# Patient Record
Sex: MALE | Race: WHITE | URBAN - METROPOLITAN AREA
[De-identification: names, ages, dates, MRNs, and addresses within clinical notes are randomized per-mention and may not be internally consistent; named-entity substitution may affect disease eponyms.]

---

## 2018-02-20 ENCOUNTER — OFFICE VISIT (OUTPATIENT)
Dept: PSYCHIATRY | Facility: CLINIC | Age: 18
End: 2018-02-20
Attending: PSYCHIATRY & NEUROLOGY
Payer: COMMERCIAL

## 2018-02-20 ENCOUNTER — TELEPHONE (OUTPATIENT)
Dept: PSYCHIATRY | Facility: CLINIC | Age: 18
End: 2018-02-20

## 2018-02-20 DIAGNOSIS — F42.2 MIXED OBSESSIONAL THOUGHTS AND ACTS: Primary | ICD-10-CM

## 2018-02-20 NOTE — PROGRESS NOTES
"PSYCHIATRY INITIAL EVALUATION     Referring Clinician/Clinic: KENDELL Gresham at [facility]     Patient Age: 17 Patient Gender: M Date of Assessment: 02/20/2018     Chief Complaint:  Longstanding history of OCD. Symptoms have reportedly worsened in the past 2 months.     History of Present Illness: Huey presented alone for a 60 minute intake appointment (12:30-1:30pm).     He reports a history of OCD (checking rituals) since middle school. Throughout middle school and high school, he reportedly had a ritual at bedtime where he checked the outside house doors, closed the blinds, checked around his bed and in his closet to make sure things were in the right place, and closed his door at the same angle each night.    Currently, patient reports three areas of checking. Before bed (and when he leaves his dorm), he checks his desk from left to right to make sure that his possessions are in the correct spot and accounted for. He checks the items in the same order each time. In the past (last semester) this nighttime ritual took ~5 minutes, and now he reports it takes ~15-20 minutes each night. He reports taking pictures of his dorm room as an additional check. He also reports checking that his dorm room door is locked; he said he will repeatedly check the lock, walk back after leaving to check again, and take pictures/videos of him checking the lock. This takes around 3 minutes to finish the ritual. He also reports checking his backpack to make sure that his laptop is in the back pocket multiple times when he leaves a class, and checking around his desk when he leaves.     He reported that the worry about losing or misplacing something takes 30-60 minutes total of his day and rated it a 5-6/10 on an intensity scale. If he thinks he loses something, he reportedly \"shuts down\" and will disengage from whatever task he is doing and can't move on until he finds the item. This happens 1-2 times per day.    He also reported " "generalized anxiety/worry about numerous things and said he tends to \"overthink.\" He rated this a 6/10 in intensity and said he spends more than half of the waking hours worrying.    Current Medications per patient report: Cefuroxime.     Current/Past Medical History: non-contributory.     Past Psychiatric History:   Hospitalization: none reported.  Psychiatric Medications: none reported.  Psychotherapy: counseling at [facility]. No CBT.    Past/Current Chemical Dependency: Denied.    Family History: Per patient report, positive for depression (sibling; mother; maternal aunt/grandmother); anxiety (sibling and mother).     Social History: Born and raised in [place], [place].  Birth through high school: Graduated high school.  Post-secondary education: Currently enrolled as a freshman at the [employer]. Reported that this semester is easier than last semester. Considering communications major.  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    Other Data: N/A   Test Data: N/A  Records Provided: N/A  Axis I: OCD (DSM-5 300.3 (F42.2) )     Goals/Plan: Continue with 10-sessions of CBT/exposure and response prevention treatment for OCD next week. He was asked to monitor rituals/symptoms and complete the intake questionnaire before our second session (2/27/18).       Appearance Appropriate / Stated Age / Well Groomed   ________________________________________________________  Speech Logical ________________  1)   Thought Process  Logical, Goal Oriented ______________________    Thought Content No evidence of delusions/Hallucinations. Consistent with conversation. __________________________________________________  Suicide/Homicide Risk Denied.  ______  Insight/Judgment Good ___   ______  Mood  Euthymic   ______  Affect Appropriate _    Orientation Time, Place, Person Intact ______________________________________  Musculoskeletal Gait, Posture Unremarkable " _______________________________________  Memory Intact    Attention Intact    Language/Knowledge Intact

## 2018-02-20 NOTE — TELEPHONE ENCOUNTER
Patient completed a New Patient Questionnaire.  I sent the document to scanning and held a copy in Psychiatry until scanning complete/confirmed.Mary Kay Muniz/LUCIEN

## 2018-02-20 NOTE — MR AVS SNAPSHOT
After Visit Summary   2018    Huey Burnette    MRN: [id number]           Patient Information     Date Of Birth          2000        Visit Information        Provider Department      2018 12:30 PM [facility]        Today's Diagnoses     Mixed obsessional thoughts and acts    -  1       Follow-ups after your visit        Your next 10 appointments already scheduled     2018 12:30 PM CST   Adult Psychotherapy with [facility]   Psychiatry Clinic ([facility])    [address]  [address] [facility] [address] [phone]              Who to contact     Please call your clinic at [phone] to:    Ask questions about your health    Make or cancel appointments    Discuss your medicines    Learn about your test results    Speak to your doctor            Additional Information About Your Visit        MyChart Information     Rexlyt is an electronic gateway that provides easy, online access to your medical records. With [email or web address], you can request a clinic appointment, read your test results, renew a prescription or communicate with your care team.     To sign up for Rexlyt visit the website at [email or web address]   You will be asked to enter the access code listed below, as well as some personal information. Please follow the directions to create your username and password.     Your access code is: [email or web address]  Expires: 2018  2:01 PM     Your access code will  in 90 days. If you need help or a new code, please contact your [facility] or call [phone] for assistance.        Care EveryWhere ID     This is your Care EveryWhere ID. This could be used by other organizations to access your [facility] medical records  [id number]         Blood Pressure from Last 3 Encounters:   03/13/15 100/59   12 101/64    Weight from Last 3 Encounters:   03/13/15 57.5 kg  (126 lb 12.2 oz) (29 %)*   06/22/12 43 kg (94 lb 12.8 oz) (21 %)*     * Growth percentiles are based on [facility] 2-20 Years data.              Today, you had the following     No orders found for display       Primary Care Provider Office Phone # Fax #    Ulises Taveras [phone]       [facility] [address] [facility] [address]  [facility] [address]        Equal Access to Services     ERICKSON SCHILLING : Hadii aad ku hadasho Soomaali, waaxda luqadaha, qaybta kaalmada adeegyada, waxay idiin hayaan adeeg kharash la'aan . So [facility] [phone].    ATENCIÓN: Si habla español, tiene a gresham disposición servicios gratuitos de asistencia lingüística. [facility] [phone].    We comply with applicable federal civil rights laws and [place] laws. We do not discriminate on the basis of race, color, national origin, age, disability, sex, sexual orientation, or gender identity.            Thank you!     Thank you for choosing PSYCHIATRY CLINIC  for your care. Our goal is always to provide you with excellent care. Hearing back from our patients is one way we can continue to improve our services. Please take a few minutes to complete the written survey that you may receive in the mail after your visit with us. Thank you!             Your Updated Medication List - Protect others around you: Learn how to safely use, store and throw away your medicines at www.disposemymeds.org.      Notice  As of 2/20/2018 11:59 PM    You have not been prescribed any medications.

## 2018-02-21 ASSESSMENT — PATIENT HEALTH QUESTIONNAIRE - PHQ9: SUM OF ALL RESPONSES TO PHQ QUESTIONS 1-9: 10

## 2018-02-22 NOTE — PROGRESS NOTES
I (Constantino Galicia, Ph.D., ) reviewed this note and agree with its contents.  I was present during the key elements of this evaluation.  This session will be discussed in supervision prior to the patient's next scheduled clinic appointment.

## 2018-02-27 ENCOUNTER — OFFICE VISIT (OUTPATIENT)
Dept: PSYCHIATRY | Facility: CLINIC | Age: 18
End: 2018-02-27
Attending: PSYCHIATRY & NEUROLOGY
Payer: COMMERCIAL

## 2018-02-27 DIAGNOSIS — F42.2 MIXED OBSESSIONAL THOUGHTS AND ACTS: Primary | ICD-10-CM

## 2018-02-27 NOTE — MR AVS SNAPSHOT
After Visit Summary   2018    Huey Burnette    MRN: [id number]           Patient Information     Date Of Birth          2000        Visit Information        Provider Department      2018 12:30 PM [facility]        Today's Diagnoses     Mixed obsessional thoughts and acts    -  1       Follow-ups after your visit        Your next 10 appointments already scheduled     Mar 13, 2018 12:30 PM CDT   Adult Psychotherapy with [facility]   Psychiatry Clinic ([facility])    [address]  [address] [address] [phone]              Who to contact     Please call your clinic at [phone] to:    Ask questions about your health    Make or cancel appointments    Discuss your medicines    Learn about your test results    Speak to your doctor            Additional Information About Your Visit        MyChart Information     Petcube is an electronic gateway that provides easy, online access to your medical records. With Petcube, you can request a clinic appointment, read your test results, renew a prescription or communicate with your care team.     To sign up for Advanced Personalized Diagnosticst visit the website at [email or web address]   You will be asked to enter the access code listed below, as well as some personal information. Please follow the directions to create your username and password.     Your access code is: [email or web address]  Expires: 2018  2:01 PM     Your access code will  in 90 days. If you need help or a new code, please contact your [facility] or call [phone] for assistance.        Care EveryWhere ID     This is your Care EveryWhere ID. This could be used by other organizations to access your [place] medical records  [id number]         Blood Pressure from Last 3 Encounters:   03/13/15 100/59   12 101/64    Weight from Last 3 Encounters:   03/13/15 57.5 kg  (126 lb 12.2 oz) (29 %)*   06/22/12 43 kg (94 lb 12.8 oz) (21 %)*     * Growth percentiles are based on [facility] 2-20 Years data.              Today, you had the following     No orders found for display       Primary Care Provider Office Phone # Fax #    Ulises Taveras [phone]       [facility] [address] [address]  [facility] [address]        Equal Access to Services     ERICKSON SCHILLING : Hadii aad ku hadasho Soomaali, waaxda luqadaha, qaybta kaalmada adeegyada, waxay idiin hayaan adeeg kharash la'aan . So [facility] [phone].    ATENCIÓN: Si habla español, tiene a gresham disposición servicios gratuitos de asistencia lingüística. [facility] [phone].    We comply with applicable federal civil rights laws and [place] laws. We do not discriminate on the basis of race, color, national origin, age, disability, sex, sexual orientation, or gender identity.            Thank you!     Thank you for choosing PSYCHIATRY CLINIC  for your care. Our goal is always to provide you with excellent care. Hearing back from our patients is one way we can continue to improve our services. Please take a few minutes to complete the written survey that you may receive in the mail after your visit with us. Thank you!             Your Updated Medication List - Protect others around you: Learn how to safely use, store and throw away your medicines at www.disposemymeds.org.      Notice  As of 2/27/2018 11:59 PM    You have not been prescribed any medications.

## 2018-02-27 NOTE — PROGRESS NOTES
"I met with Huey for 60 minutes (12:30-1:30 pm) of CBT for OCD. This was our second session. He completed all questionnaires and daily monitoring forms.     The majority of our session was spent discussing how the monitoring went and getting details about the rituals. He said that he noticed there isn't a certain number or amount of time he has to check; instead, he will check until it he has a \"gut feeling\" that he's done.    For his nighttime desk checking, he first does a general visual scan of both desks (left to right), and then will move left to right tapping/touching each item, then does another general sweep, makes sure everything is exactly in the right spot, and checks that his cards are in his wallet.    For the lock checking, he will lock the door, check it twice while mentally reciting the phrase \"Let's lock, let's lock today,\" and then walk down the hallway and walk back to check it three or so times. At night he mentally pairs a word (e.g., \"Shauna Streep\") with checking the lock as an associative reminder to himself if he is later unsure whether he checked the lock.      He deleted all of the videos and checking-related photos from his phone during the session, and said that it felt \"good\" to do so.    We began to make a hierarchy, and he rated most things in the 10-20 range on the SUDs. We will continue this next session.    For homework, he was asked to completely eliminate all video/photo recording, and to randomly rearrange the items on his desk approximately one hour before bed. He said that this would likely be uncomfortable but doable. We discussed the rationale for exposures and he was receptive to this.     We will meet in one week for session three.        Appearance Appropriate / Stated Age / Well Groomed   ________________________________________________________  Speech Logical ________________    Thought Process  Logical, Goal Oriented ______________________     Thought Content No " evidence of delusions/Hallucinations. Consistent with conversation. ________________  __________________________________  Suicide/Homicide Risk none reported.  ______  Insight/Judgment Good ___   ______  Mood  Euthymic   ______  Affect Appropriate _     Orientation Time, Place, Person Intact   ___________________  Musculoskeletal Gait, Posture Unremarkable _______________________________________  Memory Intact     Attention Intact     Language/Knowledge Intact

## 2018-03-06 ENCOUNTER — OFFICE VISIT (OUTPATIENT)
Dept: PSYCHIATRY | Facility: CLINIC | Age: 18
End: 2018-03-06
Attending: PSYCHOLOGIST
Payer: COMMERCIAL

## 2018-03-06 DIAGNOSIS — F42.2 MIXED OBSESSIONAL THOUGHTS AND ACTS: Primary | ICD-10-CM

## 2018-03-06 NOTE — MR AVS SNAPSHOT
After Visit Summary   3/6/2018    Huey Burnette    MRN: [id number]           Patient Information     Date Of Birth          2000        Visit Information        Provider Department      3/6/2018 12:30 PM [facility]        Today's Diagnoses     Mixed obsessional thoughts and acts    -  1       Follow-ups after your visit        Your next 10 appointments already scheduled     Mar 13, 2018 12:30 PM CDT   Adult Psychotherapy with [facility]   Psychiatry Clinic ([facility])    [address]  [address] [address] [phone]              Who to contact     Please call your clinic at [phone] to:    Ask questions about your health    Make or cancel appointments    Discuss your medicines    Learn about your test results    Speak to your doctor            Additional Information About Your Visit        MyChart Information     Meal Sharing is an electronic gateway that provides easy, online access to your medical records. With Meal Sharing, you can request a clinic appointment, read your test results, renew a prescription or communicate with your care team.     To sign up for Foodziet visit the website at [email or web address]   You will be asked to enter the access code listed below, as well as some personal information. Please follow the directions to create your username and password.     Your access code is: [email or web address]  Expires: 2018  2:01 PM     Your access code will  in 90 days. If you need help or a new code, please contact your [facility] or call [phone] for assistance.        Care EveryWhere ID     This is your Care EveryWhere ID. This could be used by other organizations to access your [facility] medical records  [id number]         Blood Pressure from Last 3 Encounters:   03/13/15 100/59   12 101/64    Weight from Last 3 Encounters:   03/13/15 57.5 kg  (126 lb 12.2 oz) (29 %)*   06/22/12 43 kg (94 lb 12.8 oz) (21 %)*     * Growth percentiles are based on [facility] 2-20 Years data.              Today, you had the following     No orders found for display       Primary Care Provider Office Phone # Fax #    Ulises Taveras [phone]       [facility] [address] [address]  [facility] [address]        Equal Access to Services     ERICKSON SCHILLING : Hadii aad ku hadasho Soomaali, waaxda luqadaha, qaybta kaalmada adeegyada, waxay idiin hayaan adeeg kharash la'aan . So [facility] [phone].    ATENCIÓN: Si habla español, tiene a gresham disposición servicios gratuitos de asistencia lingüística. [facility] [phone].    We comply with applicable federal civil rights laws and [place] laws. We do not discriminate on the basis of race, color, national origin, age, disability, sex, sexual orientation, or gender identity.            Thank you!     Thank you for choosing PSYCHIATRY CLINIC  for your care. Our goal is always to provide you with excellent care. Hearing back from our patients is one way we can continue to improve our services. Please take a few minutes to complete the written survey that you may receive in the mail after your visit with us. Thank you!             Your Updated Medication List - Protect others around you: Learn how to safely use, store and throw away your medicines at www.disposemymeds.org.      Notice  As of 3/6/2018 11:59 PM    You have not been prescribed any medications.

## 2018-03-06 NOTE — PROGRESS NOTES
"I met with Huey for 60 minutes (12:30-1:30) of CBT/ERP for OCD. This was our third session. He completed all daily monitoring and homework assignments. The majority of the session was spent discussing the homework, completing the hierarchy, and designing future homework.    He completed both daily homework assignments. He completely eliminated all video/photo recording. He reported feeling the need to take videos/photos while locking the door or checking the desk, but did not give in to the urge. His SUDs reportedly peaked around 20-25 the first few days he refrained, but his SUDs reduced to 10-15 by the end of the week. He successfully rearranged the items on his desk each night, both in terms of where they go and left/right side of the desk. He said it felt \"funky\" to not have things in the 'right place.' His SUDs peaked around 20 for the first few exposures, and decreased with repetition. He said that he is still checking the desk in the same fashion (e.g., left to right, tapping), and that he is associating the objects with their position each time he shuffles.    The amount of reported time checking the desk has decreased to 10-15 minutes a night, but the lock checking has increased to 5-6 minutes (he attributes this to extra checking because of the removal of the video).    Desk checking hierarchy:  - Only physically touching wallet, notebooks, and laptop - 20  - No physical touching - 20-30  - Checking for max 10 minutes - 20-30  - Checking for max 5 minutes - 40-50 (he said he would likely \"crash\" if he did this now)  - No scanning or checking - 70-80    Lock checking hierarchy:  - Check lock a maximum of 3 times - 20-30  - No verbal reminders - \"difficult\"  - No touching door handle after locking - third most difficult  - Locking and checking once while occupied with phone - second most difficult  - Just lock and leave, no checking - first most difficult     He was visibly anxious while discussing some of " the possible homeworks. I introduced the imaginal exposures, and he was interested in trying this exercise. We will plan to record one next session.    He also endorsed some contamination concerns on the questionnaires. When asked, he stated avoiding public restrooms for fear of contamination and said he could not touch something in one and not wash his hands. He was slightly resistive when discussing this area, and so I did not probe further.    For homework, he was asked to continue shuffling items and refraining from video/photo recording. In addition, we decided on 1) limiting the time he can check the desk at night to 10 minutes, and 2) establishing a max of 3 door lock checks. He was receptive to this.     We will meet in one week for session 4.      Appearance Appropriate / Stated Age / Well Groomed   ________________________________________________________  Speech Logical ________________     Thought Process  Logical, Goal Oriented ______________________      Thought Content No evidence of delusions/Hallucinations. Consistent with conversation. ________________  __________________________________  Suicide/Homicide Risk none reported.  ______  Insight/Judgment Good ___   ______  Mood  Euthymic   ______  Affect Appropriate; anxious when discussing potential homeworks      Orientation Time, Place, Person Intact   ___________________  Musculoskeletal Gait, Posture Unremarkable _______________________________________  Memory Intact      Attention Intact      Language/Knowledge Intact

## 2018-03-08 NOTE — PROGRESS NOTES
I (Constantino Galicia, Ph.D., ) reviewed this note and agree with its contents.  I did not staff this session in clinic. This session will be discussed in supervision prior to this patient's next scheduled clinic appointment.

## 2018-03-13 ENCOUNTER — OFFICE VISIT (OUTPATIENT)
Dept: PSYCHIATRY | Facility: CLINIC | Age: 18
End: 2018-03-13
Attending: PSYCHIATRY & NEUROLOGY
Payer: COMMERCIAL

## 2018-03-13 DIAGNOSIS — F42.2 MIXED OBSESSIONAL THOUGHTS AND ACTS: Primary | ICD-10-CM

## 2018-03-13 NOTE — MR AVS SNAPSHOT
After Visit Summary   3/13/2018    Huey Burnette    MRN: [id number]           Patient Information     Date Of Birth          2000        Visit Information        Provider Department      3/13/2018 12:30 PM [facility]        Today's Diagnoses     Mixed obsessional thoughts and acts    -  1       Follow-ups after your visit        Your next 10 appointments already scheduled     Mar 27, 2018 12:30 PM CDT   Adult Psychotherapy with [facility]   Psychiatry Clinic ([facility])    [address]  [address] [address] [phone]              Who to contact     Please call your clinic at [phone] to:    Ask questions about your health    Make or cancel appointments    Discuss your medicines    Learn about your test results    Speak to your doctor            Additional Information About Your Visit        MyChart Information     Skyword is an electronic gateway that provides easy, online access to your medical records. With Skyword, you can request a clinic appointment, read your test results, renew a prescription or communicate with your care team.     To sign up for J2 Software Solutionst visit the website at [email or web address]   You will be asked to enter the access code listed below, as well as some personal information. Please follow the directions to create your username and password.     Your access code is: [email or web address]  Expires: 2018  3:01 PM     Your access code will  in 90 days. If you need help or a new code, please contact your [facility] or call [phone] for assistance.        Care EveryWhere ID     This is your Care EveryWhere ID. This could be used by other organizations to access your [place] medical records  [id number]         Blood Pressure from Last 3 Encounters:   03/13/15 100/59   12 101/64    Weight from Last 3 Encounters:   03/13/15 57.5 kg  (126 lb 12.2 oz) (29 %)*   06/22/12 43 kg (94 lb 12.8 oz) (21 %)*     * Growth percentiles are based on [facility][note 2] 2-20 Years data.              Today, you had the following     No orders found for display       Primary Care Provider Office Phone # Fax #    Ulises Taveras [phone]       [facility] [address] [address]  [facility] [address]        Equal Access to Services     ERICKSON SCHILLING : Hadii aad ku hadasho Soomaali, waaxda luqadaha, qaybta kaalmada adeegyada, waxay idiin hayaan adeeg kharash la'aan . So [facility] [phone].    ATENCIÓN: Si habla español, tiene a gresham disposición servicios gratuitos de asistencia lingüística. [facility] [phone].    We comply with applicable federal civil rights laws and [place] laws. We do not discriminate on the basis of race, color, national origin, age, disability, sex, sexual orientation, or gender identity.            Thank you!     Thank you for choosing [facility]  for your care. Our goal is always to provide you with excellent care. Hearing back from our patients is one way we can continue to improve our services. Please take a few minutes to complete the written survey that you may receive in the mail after your visit with us. Thank you!             Your Updated Medication List - Protect others around you: Learn how to safely use, store and throw away your medicines at www.disposemymeds.org.      Notice  As of 3/13/2018 11:59 PM    You have not been prescribed any medications.

## 2018-03-13 NOTE — PROGRESS NOTES
I met with Huey 47 minutes (12:30-1:17 pm) of CBT/ERP for OCD. This was our fourth session. He completed all monitoring and homework assignments. He reported noticing some symptom improvement over the last week. The majority of this session was spent discussing the homework and doing an imaginal exposure.    He successfully completed both homework assignments (limiting desk checking to 10 minutes; limiting door checking to three times), although he was only about to do three days of them because he is staying at his parent's for spring break. Per his report and the SUDs ratings, he experienced habituation for the desk checking. He said the door checking was more difficult, but still doable, and the amount of time he is spending on checking has decreased.    We worked together to write an imaginal exposure involving him being unsure of locking his dorm door, going to class, and coming back to find that someone has broken in and gone through/stolen his possessions. I explained the rational for the imaginal exposures, and he was receptive. I had him write out the scenario and then record it; his SUDs peaked at 20-25, and the exercise was reportedly anxiety-provoking for him. We discussed the process of habituation for this exercise, and he was receptive.    For homework, we agreed on him listening to the imaginal exposure twice per day, and eliminating all lock checking that he is doing at his parent's house. Next session we will return to working on the desk checking and dorm locking. I will see him in one week for session 5.     Appearance Appropriate / Stated Age / Well Groomed   ________________________________________________________  Speech Logical ________________      Thought Process  Logical, Goal Oriented ______________________      Thought Content No evidence of delusions/Hallucinations. Consistent with conversation. ________________  __________________________________  Suicide/Homicide Risk none  reported.  ______  Insight/Judgment Good ___   ______  Mood  Euthymic   ______  Affect Appropriate; anxious when discussing potential homeworks      Orientation Time, Place, Person Intact   ___________________  Musculoskeletal Gait, Posture Unremarkable _______________________________________  Memory Intact      Attention Intact      Language/Knowledge Intact

## 2018-03-20 ENCOUNTER — OFFICE VISIT (OUTPATIENT)
Dept: PSYCHIATRY | Facility: CLINIC | Age: 18
End: 2018-03-20
Attending: PSYCHOLOGIST
Payer: COMMERCIAL

## 2018-03-20 DIAGNOSIS — F42.2 MIXED OBSESSIONAL THOUGHTS AND ACTS: Primary | ICD-10-CM

## 2018-03-20 NOTE — MR AVS SNAPSHOT
After Visit Summary   3/20/2018    Huey Burnette    MRN: [id number]           Patient Information     Date Of Birth          2000        Visit Information        Provider Department      3/20/2018 12:30 PM [facility]        Today's Diagnoses     Mixed obsessional thoughts and acts    -  1       Follow-ups after your visit        Your next 10 appointments already scheduled     Mar 27, 2018 12:30 PM CDT   Adult Psychotherapy with [facility]   Psychiatry Clinic ([facility])    [address]  [address] [address] [phone]              Who to contact     Please call your clinic at [phone] to:    Ask questions about your health    Make or cancel appointments    Discuss your medicines    Learn about your test results    Speak to your doctor            Additional Information About Your Visit        MyChart Information     [employer] is an electronic gateway that provides easy, online access to your medical records. With [employer], you can request a clinic appointment, read your test results, renew a prescription or communicate with your care team.     To sign up for WorkToucht visit the website at [email or web address]   You will be asked to enter the access code listed below, as well as some personal information. Please follow the directions to create your username and password.     Your access code is: [email or web address]  Expires: 2018  3:01 PM     Your access code will  in 90 days. If you need help or a new code, please contact your [facility] or call [phone] for assistance.        Care EveryWhere ID     This is your Care EveryWhere ID. This could be used by other organizations to access your [place] medical records  [id number]         Blood Pressure from Last 3 Encounters:   03/13/15 100/59   12 101/64    Weight from Last 3 Encounters:   03/13/15 57.5 kg  (126 lb 12.2 oz) (29 %)*   06/22/12 43 kg (94 lb 12.8 oz) (21 %)*     * Growth percentiles are based on [facility] 2-20 Years data.              Today, you had the following     No orders found for display       Primary Care Provider Office Phone # Fax #    Ulises Taveras [phone]       [facility] [address] [address]  [facility] [address]        Equal Access to Services     ERICKSON SCHILLING : Hadii aad ku hadasho Soomaali, waaxda luqadaha, qaybta kaalmada adeegyada, waxay idiin hayaan adeeg kharash la'aan . So [facility] [phone].    ATENCIÓN: Si habla español, tiene a gresham disposición servicios gratuitos de asistencia lingüística. [facility] [phone].    We comply with applicable federal civil rights laws and [place] laws. We do not discriminate on the basis of race, color, national origin, age, disability, sex, sexual orientation, or gender identity.            Thank you!     Thank you for choosing [facility]  for your care. Our goal is always to provide you with excellent care. Hearing back from our patients is one way we can continue to improve our services. Please take a few minutes to complete the written survey that you may receive in the mail after your visit with us. Thank you!             Your Updated Medication List - Protect others around you: Learn how to safely use, store and throw away your medicines at www.disposemymeds.org.      Notice  As of 3/20/2018 11:59 PM    You have not been prescribed any medications.

## 2018-03-20 NOTE — PROGRESS NOTES
"I met with Huey for 62 minutes (12:30-1:32pm) of CBT/ERP for OCD. This was our fifth session. He completed all daily monitoring and homework assignments.    The majority of our session was spent discussing the homework, doing an imaginal exposure, and discussing the homework for the next week. We discussed progress so far, and he stated that he is noticing improvements both in the OCD-related problems and anxiety in general.    He reported experiencing some habituation to the imaginal exposure homework, and said that it was not very activating unless he was already feeling anxious. He successfully refrained from checking the locks at his parent's home over spring break.    He completed another imaginal exposure in session, where he imagined himself having the intrusive thoughts while walking toward a particular corner that often elicits the worry regarding door locking. The imaginal scenario involved thoughts that someone broke into his dorm, handled his possessions with dirty hands (invading his privacy and contamination), and stole his valuables. He reported that the exercise was anxiety-provoking and felt like it would be a good exposure.     We collaborated on the homeworks for this week. He was asked to:  1) Listen to the imaginal exposure two times per day at the spot he describes in the imaginal (he suggested that this would be more difficult and \"effective\" than doing it in his dorm). He was asked not to go back to his dorm for one hour after listening to the imaginal.    2) Limit the desk checking to four minutes for three nights, and if he experiences habituation, move down to two minutes. He reportedly felt that going straight to two minutes would be too difficult, so we negotiated to four then two.    3) Eliminate all but the physical check of the door handle, limiting the time to one second (counting \"one-one thousand\" out loud), and a maximum of two checks. He felt that eliminating both the physical " check and going to only one was too much too soon, so we negotiated to eliminate all but physically checking the door handle.    We will meet in one week for session six.      Appearance Appropriate / Stated Age / Well Groomed   ________________________________________________________  Speech Logical ________________      Thought Process  Logical, Goal Oriented ______________________      Thought Content No evidence of delusions/Hallucinations. Consistent with conversation. ________________  __________________________________  Suicide/Homicide Risk none reported.  ______  Insight/Judgment Good ___   ______  Mood  Euthymic   ______  Affect Appropriate; anxious when discussing potential homeworks      Orientation Time, Place, Person Intact   ___________________  Musculoskeletal Gait, Posture Unremarkable _______________________________________  Memory Intact      Attention Intact      Language/Knowledge Intact

## 2018-03-23 NOTE — PROGRESS NOTES
I (Constantino Galicia, Ph.D., ) reviewed this note and agree with its contents. I did not staff this session in clinic. This session will be discussed in supervision prior to the patient's next scheduled clinic appointment.

## 2018-03-27 ENCOUNTER — OFFICE VISIT (OUTPATIENT)
Dept: PSYCHIATRY | Facility: CLINIC | Age: 18
End: 2018-03-27
Attending: PSYCHIATRY & NEUROLOGY
Payer: COMMERCIAL

## 2018-03-27 DIAGNOSIS — F42.2 MIXED OBSESSIONAL THOUGHTS AND ACTS: Primary | ICD-10-CM

## 2018-03-27 NOTE — MR AVS SNAPSHOT
After Visit Summary   3/27/2018    Huey Burnette    MRN: [id number]           Patient Information     Date Of Birth          2000        Visit Information        Provider Department      3/27/2018 12:30 PM [facility]        Today's Diagnoses     Mixed obsessional thoughts and acts    -  1       Follow-ups after your visit        Your next 10 appointments already scheduled     2018 12:30 PM CDT   Adult Psychotherapy with [facility]   Psychiatry Clinic ([facility])    [address]  [address] [address] [phone]              Who to contact     Please call your clinic at [phone] to:    Ask questions about your health    Make or cancel appointments    Discuss your medicines    Learn about your test results    Speak to your doctor            Additional Information About Your Visit        MyChart Information     [employer] is an electronic gateway that provides easy, online access to your medical records. With [employer], you can request a clinic appointment, read your test results, renew a prescription or communicate with your care team.     To sign up for eyesFindert visit the website at [email or web address]   You will be asked to enter the access code listed below, as well as some personal information. Please follow the directions to create your username and password.     Your access code is: [email or web address]  Expires: 2018  3:01 PM     Your access code will  in 90 days. If you need help or a new code, please contact your [facility] or call [phone] for assistance.        Care EveryWhere ID     This is your Care EveryWhere ID. This could be used by other organizations to access your [place] medical records  [id number]         Blood Pressure from Last 3 Encounters:   03/13/15 100/59   12 101/64    Weight from Last 3 Encounters:   03/13/15 57.5 kg  (126 lb 12.2 oz) (29 %)*   06/22/12 43 kg (94 lb 12.8 oz) (21 %)*     * Growth percentiles are based on [facility] 2-20 Years data.              Today, you had the following     No orders found for display       Primary Care Provider Office Phone # Fax #    Ulises Taveras [phone]       [facility] [address] [address]  [facility] [address]        Equal Access to Services     ERICKSON SCHILLING : Hadii aad ku hadasho Soomaali, waaxda luqadaha, qaybta kaalmada adeegyada, waxay idiin hayaan adeeg kharash la'aan . So [facility] [phone].    ATENCIÓN: Si habla español, tiene a gresham disposición servicios gratuitos de asistencia lingüística. [facility] [phone].    We comply with applicable federal civil rights laws and [place] laws. We do not discriminate on the basis of race, color, national origin, age, disability, sex, sexual orientation, or gender identity.            Thank you!     Thank you for choosing [facility]  for your care. Our goal is always to provide you with excellent care. Hearing back from our patients is one way we can continue to improve our services. Please take a few minutes to complete the written survey that you may receive in the mail after your visit with us. Thank you!             Your Updated Medication List - Protect others around you: Learn how to safely use, store and throw away your medicines at www.disposemymeds.org.      Notice  As of 3/27/2018 11:59 PM    You have not been prescribed any medications.

## 2018-03-27 NOTE — PROGRESS NOTES
"I met with Huey for 53 minutes (12:30-1:23pm) of CBT/ERP for OCD. This was our sixth session. He successfully completed the daily monitoring and homework assignments.    We discussed treatment progression so far, and we mutually agreed that he has made good progress and has been committed to the therapy. I commended him on his progress, and motivated him to leverage that momentum to help work through the remaining hierarchy. He was receptive to this.    He stated that the desk checking behaviors have improved, but that the door checking has been more difficult. Per the forms, his SUDs peaked at 40 when he limited desk checking to 4 minutes, but experienced habituation and was able to go down to a 2 minute limit (SUDs = 10). In terms of door lock checking, he did go down to a single check of the handle while counting \"one-one thousand\", but \"cheated\" (his term) a few times and recheck once. His SUDs peaked at 50 during the locking. He did listen to the imaginal exposure 5/7 days, and said that it was not very activating.    We discussed treatment expectations and planned for the remaining sessions. He stated that his goal is to completely eliminate all desk and door checking.     We mutually agreed on the following homework:    1) Eliminate physical checking of desk, limit visual scanning to 30 seconds, stand back 3 feet when checking.    2) He was asked to do just a single check of the door handle while counting to one out loud. For the next week, we agreed to do a single check while distracted, then no checking, then no checking while distracted; this progression was planned to work toward no checking, which is the top of his hierarchy.     We will meet in one week for session seven.       Appearance Appropriate / Stated Age / Well Groomed   ________________________________________________________  Speech Logical ________________      Thought Process  Logical, Goal Oriented ______________________      Thought " Content No evidence of delusions/Hallucinations. Consistent with conversation. ________________  __________________________________  Suicide/Homicide Risk none reported.  ______  Insight/Judgment Good ___   ______  Mood  Euthymic   ______  Affect Appropriate; anxious when discussing potential homeworks      Orientation Time, Place, Person Intact   ___________________  Musculoskeletal Gait, Posture Unremarkable _______________________________________  Memory Intact      Attention Intact      Language/Knowledge Intact

## 2018-04-03 ENCOUNTER — OFFICE VISIT (OUTPATIENT)
Dept: PSYCHIATRY | Facility: CLINIC | Age: 18
End: 2018-04-03
Attending: PSYCHIATRY & NEUROLOGY
Payer: COMMERCIAL

## 2018-04-03 DIAGNOSIS — F42.2 MIXED OBSESSIONAL THOUGHTS AND ACTS: Primary | ICD-10-CM

## 2018-04-03 NOTE — PROGRESS NOTES
I met with Huey for 63 minutes (12:30-1:33pm) of CBT/EPR for OCD. This was our seventh session. He completed all monitoring and homework.    He reported having two panic attacks on Sunday. He has a history of panic attacks triggered by anxiety-provoking situations, but he said this one was 'out of the blue.' They included hyperventilation, sobbing, and a sense of losing control. I provided education about panic, and we discussed a few strategies if they were to occur again. As this is not the focus of our therapy, I recommended that if these symptoms worsen he may consider CBT focused on panic or consider a possible med management consultation. He was receptive to this.    He successfully completed all homework assignments, both eliminating physical desk checking and limiting visual check to 30 seconds, and doing a single door handle check. We discussed the homework in detail. He reported using a strategy similar to cognitive restructuring to help cope with the anxiety provoking thoughts, and so we did an in session cognitive restructuring log for the door checking. He was receptive to this, and stated that he thinks this will help in getting down to zero checking.    For homework, we agreed on completely eliminating all desk and door lock checking. We will meet in one week for session eight.    Appearance Appropriate / Stated Age / Well Groomed   ________________________________________________________  Speech Logical ________________      Thought Process  Logical, Goal Oriented ______________________      Thought Content No evidence of delusions/Hallucinations. Consistent with conversation. ________________  __________________________________  Suicide/Homicide Risk none reported.  ______  Insight/Judgment Good ___   ______  Mood  Euthymic   ______  Affect Appropriate; anxious when discussing potential homeworks      Orientation Time, Place, Person Intact   ___________________  Musculoskeletal Gait, Posture  Unremarkable _______________________________________  Memory Intact      Attention Intact      Language/Knowledge Intact

## 2018-04-03 NOTE — MR AVS SNAPSHOT
After Visit Summary   4/3/2018    Huey Burnette    MRN: [id number]           Patient Information     Date Of Birth          2000        Visit Information        Provider Department      4/3/2018 12:30 PM [facility]        Today's Diagnoses     Mixed obsessional thoughts and acts    -  1       Follow-ups after your visit        Your next 10 appointments already scheduled     2018 12:30 PM CDT   Adult Psychotherapy with [facility]   Psychiatry Clinic ([facility])    [address]  [address] [address] [phone]              Who to contact     Please call your clinic at [phone] to:    Ask questions about your health    Make or cancel appointments    Discuss your medicines    Learn about your test results    Speak to your doctor            Additional Information About Your Visit        MyChart Information     [employer] is an electronic gateway that provides easy, online access to your medical records. With [employer], you can request a clinic appointment, read your test results, renew a prescription or communicate with your care team.     To sign up for PlanZ[employer] visit the website at [email or web address]   You will be asked to enter the access code listed below, as well as some personal information. Please follow the directions to create your username and password.     Your access code is: [email or web address]  Expires: 2018  3:01 PM     Your access code will  in 90 days. If you need help or a new code, please contact your [facility] or call [phone] for assistance.        Care EveryWhere ID     This is your Care EveryWhere ID. This could be used by other organizations to access your [place] medical records  [id number]         Blood Pressure from Last 3 Encounters:   03/13/15 100/59   12 101/64    Weight from Last 3 Encounters:   03/13/15 57.5 kg  (126 lb 12.2 oz) (29 %)*   06/22/12 43 kg (94 lb 12.8 oz) (21 %)*     * Growth percentiles are based on [facility] 2-20 Years data.              Today, you had the following     No orders found for display       Primary Care Provider Office Phone # Fax #    Ulises Taveras [phone]       [facility] [address] [address]  [facility] [address]        Equal Access to Services     ERICKSON SCHILLING : Hadii aad ku hadasho Soomaali, waaxda luqadaha, qaybta kaalmada adeegyada, waxay idiin hayaan adeeg kharash la'aan . So [facility] [phone].    ATENCIÓN: Si habla español, tiene a gresham disposición servicios gratuitos de asistencia lingüística. [facility] [phone].    We comply with applicable federal civil rights laws and [place] laws. We do not discriminate on the basis of race, color, national origin, age, disability, sex, sexual orientation, or gender identity.            Thank you!     Thank you for choosing [facility]  for your care. Our goal is always to provide you with excellent care. Hearing back from our patients is one way we can continue to improve our services. Please take a few minutes to complete the written survey that you may receive in the mail after your visit with us. Thank you!             Your Updated Medication List - Protect others around you: Learn how to safely use, store and throw away your medicines at www.disposemymeds.org.      Notice  As of 4/3/2018 11:59 PM    You have not been prescribed any medications.

## 2018-04-10 ENCOUNTER — OFFICE VISIT (OUTPATIENT)
Dept: PSYCHIATRY | Facility: CLINIC | Age: 18
End: 2018-04-10
Attending: PSYCHIATRY & NEUROLOGY
Payer: COMMERCIAL

## 2018-04-10 DIAGNOSIS — F42.2 MIXED OBSESSIONAL THOUGHTS AND ACTS: Primary | ICD-10-CM

## 2018-04-10 NOTE — MR AVS SNAPSHOT
After Visit Summary   4/10/2018    Huey Burnette    MRN: [id number]           Patient Information     Date Of Birth          2000        Visit Information        Provider Department      4/10/2018 12:30 PM [facility]        Today's Diagnoses     Mixed obsessional thoughts and acts    -  1       Follow-ups after your visit        Your next 10 appointments already scheduled     2018 12:30 PM CDT   Adult Psychotherapy with [facility]   Psychiatry Clinic ([facility])    [address]  [address] [address] [phone]              Who to contact     Please call your clinic at [phone] to:    Ask questions about your health    Make or cancel appointments    Discuss your medicines    Learn about your test results    Speak to your doctor            Additional Information About Your Visit        MyChart Information     [employer] is an electronic gateway that provides easy, online access to your medical records. With Stream[employer], you can request a clinic appointment, read your test results, renew a prescription or communicate with your care team.     To sign up for [employer] visit the website at [email or web address]   You will be asked to enter the access code listed below, as well as some personal information. Please follow the directions to create your username and password.     Your access code is: [email or web address]  Expires: 2018  3:01 PM     Your access code will  in 90 days. If you need help or a new code, please contact your [facility] or call [phone] for assistance.        Care EveryWhere ID     This is your Care EveryWhere ID. This could be used by other organizations to access your [place] medical records  [id number]         Blood Pressure from Last 3 Encounters:   03/13/15 100/59   12 101/64    Weight from Last 3 Encounters:   03/13/15 57.5 kg  (126 lb 12.2 oz) (29 %)*   06/22/12 43 kg (94 lb 12.8 oz) (21 %)*     * Growth percentiles are based on [facility] 2-20 Years data.              Today, you had the following     No orders found for display       Primary Care Provider Office Phone # Fax #    Ulises Taveras [phone]       [facility] [address] [address]  [facility] [address]        Equal Access to Services     ERICKSON SCHILLING : Hadii aad ku hadasho Soomaali, waaxda luqadaha, qaybta kaalmada adeegyada, waxay idiin hayaan adeeg kharash la'aan . So [facility] [phone].    ATENCIÓN: Si habla español, tiene a gresham disposición servicios gratuitos de asistencia lingüística. [facility] [phone].    We comply with applicable federal civil rights laws and [place] laws. We do not discriminate on the basis of race, color, national origin, age, disability, sex, sexual orientation, or gender identity.            Thank you!     Thank you for choosing [facility]  for your care. Our goal is always to provide you with excellent care. Hearing back from our patients is one way we can continue to improve our services. Please take a few minutes to complete the written survey that you may receive in the mail after your visit with us. Thank you!             Your Updated Medication List - Protect others around you: Learn how to safely use, store and throw away your medicines at www.disposemymeds.org.      Notice  As of 4/10/2018 11:59 PM    You have not been prescribed any medications.

## 2018-04-10 NOTE — PROGRESS NOTES
"I met with Huey for 45 minutes (12:30pm-1:15pm) of CBT/ERP for OCD. This was our eighth session. He successfully completed all homework assignments and daily monitoring. He said that his week was \"pretty good.\" He reported having another panic-like episode on Friday evening, but he was able to work through it so that it did not develop into a full panic attack. I provided more education about panic attacks and the prevalence of panic in college-aged individuals. He was receptive to this.     The majority of our session was spent discussing the homework from the last week.     In terms of OCD, he was successful in completely eliminating all desk and door handle checking. He reported 85-90% symptom reduction since beginning therapy. He reported that there were times when he noticed he was physically going to check, but was able to stop himself. He reported that he did not have any thoughts about being uncertain that he locked the door or lost something over the last week, and that they are less frequent and strong than they were in the past.     We discussed the possibility of our next session being the last if symptoms are stable by next week. We will meet in one week for session nine. He was asked to continue to practice the skills and eliminate checking behaviors.      Appearance Appropriate / Stated Age / Well Groomed   ________________________________________________________  Speech Logical ________________      Thought Process  Logical, Goal Oriented ______________________      Thought Content No evidence of delusions/Hallucinations. Consistent with conversation. ________________  __________________________________  Suicide/Homicide Risk none reported.  ______  Insight/Judgment Good ___   ______  Mood  Euthymic   ______  Affect Appropriate; anxious when discussing potential homeworks      Orientation Time, Place, Person Intact   ___________________  Musculoskeletal Gait, Posture Unremarkable " _______________________________________  Memory Intact      Attention Intact      Language/Knowledge Intact

## 2018-04-13 NOTE — PROGRESS NOTES
I (Constantino Galicia, Ph.D., ) reviewed this note and agree with its contents. I did not staff this session in clinic. This session will be reviewed in supervision prior to the patient's next scheduled clinic appointment.

## 2018-04-17 ENCOUNTER — OFFICE VISIT (OUTPATIENT)
Dept: PSYCHIATRY | Facility: CLINIC | Age: 18
End: 2018-04-17
Attending: PSYCHIATRY & NEUROLOGY
Payer: COMMERCIAL

## 2018-04-17 DIAGNOSIS — F42.2 MIXED OBSESSIONAL THOUGHTS AND ACTS: Primary | ICD-10-CM

## 2018-04-17 NOTE — PROGRESS NOTES
I met with Huey for 27 minutes (12:35-1:02) of CBT/ERP for OCD. This was our ninth and final session.     He reported continued improvement and symptom stability over the last week. He did not engage in any checking behaviors. He said that the urge to check is becoming weaker, and his ability to resist any checking is getting stronger. We mutually agreed that this would be our final session. We reviewed standard aftercare informaiton and relapse prevention skills. He was receptive to this, and reportedly felt confident and prepared to end treatment. No further sessions are scheduled at this time.      Appearance Appropriate / Stated Age / Well Groomed   ________________________________________________________  Speech Logical ________________      Thought Process  Logical, Goal Oriented ______________________      Thought Content No evidence of delusions/Hallucinations. Consistent with conversation. ________________  __________________________________  Suicide/Homicide Risk none reported.  ______  Insight/Judgment Good ___   ______  Mood  Euthymic   ______  Affect Appropriate      Orientation Time, Place, Person Intact   ___________________  Musculoskeletal Gait, Posture Unremarkable _  ______________________________________  Memory Intact      Attention Intact      Language/Knowledge Intact

## 2018-04-17 NOTE — MR AVS SNAPSHOT
After Visit Summary   2018    Huey Burnette    MRN: [id number]           Patient Information     Date Of Birth          2000        Visit Information        Provider Department      2018 12:30 PM [facility]        Today's Diagnoses     Mixed obsessional thoughts and acts    -  1       Follow-ups after your visit        Who to contact     Please call your clinic at [phone] to:    Ask questions about your health    Make or cancel appointments    Discuss your medicines    Learn about your test results    Speak to your doctor            Additional Information About Your Visit        MyChart Information     Apptentive is an electronic gateway that provides easy, online access to your medical records. With Apptentive, you can request a clinic appointment, read your test results, renew a prescription or communicate with your care team.     To sign up for Apptentive visit the website at [email or web address]   You will be asked to enter the access code listed below, as well as some personal information. Please follow the directions to create your username and password.     Your access code is: [email or web address]  Expires: 2018  3:01 PM     Your access code will  in 90 days. If you need help or a new code, please contact your [facility] or call [phone] for assistance.        Care EveryWhere ID     This is your Care EveryWhere ID. This could be used by other organizations to access your [place] medical records  [id number]         Blood Pressure from Last 3 Encounters:   03/13/15 100/59   12 101/64    Weight from Last 3 Encounters:   03/13/15 57.5 kg (126 lb 12.2 oz) (29 %)*   12 43 kg (94 lb 12.8 oz) (21 %)*     * Growth percentiles are based on CDC 2-20 Years data.              Today, you had the following     No orders found for display       Primary Care Provider Office Phone # Fax #    Ulises Taveras  [phone]       [facility] [address]  [facility] [address]        Equal Access to Services     ERICKSON SCHILLING : Hadii aad ku hadevelynhieu Anahyomar, norada ashishnguyenha, marielena kadennisda roc, deb barbosa laCarolinajame toussaint. So [facility] [phone].    ATENCIÓN: Si habla español, tiene a gresham disposición servicios gratuitos de asistencia lingüística. Llame al [phone].    We comply with applicable federal civil rights laws and [place] laws. We do not discriminate on the basis of race, color, national origin, age, disability, sex, sexual orientation, or gender identity.            Thank you!     Thank you for choosing [facility]  for your care. Our goal is always to provide you with excellent care. Hearing back from our patients is one way we can continue to improve our services. Please take a few minutes to complete the written survey that you may receive in the mail after your visit with us. Thank you!             Your Updated Medication List - Protect others around you: Learn how to safely use, store and throw away your medicines at www.disposemymeds.org.      Notice  As of 4/17/2018 11:59 PM    You have not been prescribed any medications.

## 2019-05-29 ENCOUNTER — RECORDS - HEALTHEAST (OUTPATIENT)
Dept: LAB | Facility: CLINIC | Age: 19
End: 2019-05-29

## 2019-05-30 LAB — 25(OH)D3 SERPL-MCNC: 28 NG/ML (ref 30–80)

## 2019-10-10 ENCOUNTER — HOSPITAL ENCOUNTER (EMERGENCY)
Facility: CLINIC | Age: 19
Discharge: LEFT BEFORE DISCHARGE | End: 2019-10-10
Payer: COMMERCIAL

## 2019-10-10 VITALS
HEART RATE: 71 BPM | OXYGEN SATURATION: 98 % | DIASTOLIC BLOOD PRESSURE: 65 MMHG | TEMPERATURE: 95.5 F | SYSTOLIC BLOOD PRESSURE: 113 MMHG | BODY MASS INDEX: 18.57 KG/M2 | RESPIRATION RATE: 12 BRPM | WEIGHT: 147 LBS

## 2019-10-10 RX ORDER — SERTRALINE HYDROCHLORIDE 100 MG/1
100 TABLET, FILM COATED ORAL DAILY
COMMUNITY

## 2019-10-11 NOTE — ED TRIAGE NOTES
Pt was at [facility], police horse stepped only on right foot.  Pt felt hoof was on foot for 5 seconds.  Pain is explained as throbbing and hurts when toes are curled

## 2019-10-11 NOTE — ED TRIAGE NOTES
Patient determined that the wait in triage was too long and he is able to walk so he decided to go home, rest his foot and apply ice packs to it. He will return to ED if pain or swelling worsens.